# Patient Record
Sex: MALE | Race: WHITE | NOT HISPANIC OR LATINO | Employment: FULL TIME | URBAN - METROPOLITAN AREA
[De-identification: names, ages, dates, MRNs, and addresses within clinical notes are randomized per-mention and may not be internally consistent; named-entity substitution may affect disease eponyms.]

---

## 2023-06-13 ENCOUNTER — HOSPITAL ENCOUNTER (OUTPATIENT)
Dept: GENERAL RADIOLOGY | Facility: HOSPITAL | Age: 63
Discharge: HOME | End: 2023-06-13
Admitting: NURSE PRACTITIONER
Payer: COMMERCIAL

## 2023-06-13 ENCOUNTER — OFFICE VISIT (OUTPATIENT)
Dept: FAMILY MEDICINE CLINIC | Age: 63
End: 2023-06-13
Payer: COMMERCIAL

## 2023-06-13 VITALS
OXYGEN SATURATION: 95 % | BODY MASS INDEX: 31.55 KG/M2 | HEART RATE: 77 BPM | SYSTOLIC BLOOD PRESSURE: 127 MMHG | WEIGHT: 213 LBS | DIASTOLIC BLOOD PRESSURE: 79 MMHG | HEIGHT: 69 IN

## 2023-06-13 DIAGNOSIS — K59.00 CONSTIPATION, UNSPECIFIED CONSTIPATION TYPE: ICD-10-CM

## 2023-06-13 DIAGNOSIS — K59.00 CONSTIPATION, UNSPECIFIED CONSTIPATION TYPE: Primary | ICD-10-CM

## 2023-06-13 PROCEDURE — 99202 OFFICE O/P NEW SF 15 MIN: CPT | Performed by: NURSE PRACTITIONER

## 2023-06-13 PROCEDURE — 74018 RADEX ABDOMEN 1 VIEW: CPT

## 2023-06-13 RX ORDER — LISINOPRIL AND HYDROCHLOROTHIAZIDE 12.5; 1 MG/1; MG/1
1 TABLET ORAL DAILY
COMMUNITY
Start: 2023-05-03

## 2023-06-13 RX ORDER — ATORVASTATIN CALCIUM 20 MG/1
20 TABLET, FILM COATED ORAL
COMMUNITY
Start: 2023-05-03

## 2023-06-13 NOTE — ASSESSMENT & PLAN NOTE
We will get abdominal x-ray today.  Recommend that he increase fluids to include mainly water with no more than 1 diet Coke per day.  Decrease sodium intake.  He has currently been taking Dulcolax and Colace with some good results.  Further treatment pending x-ray results.  Patient defers labs today.  States he will call back to schedule a follow-up visit before he needs refills of lisinopril with hydrochlorothiazide and atorvastatin.  Encourage lifestyle measures for constipation and if not improving may need to refer for lower scope to be done.  Follow-up if symptoms or not improving.  Also increase fiber intake.

## 2023-06-13 NOTE — PROGRESS NOTES
"Chief Complaint  Establish Care (Last PCP Rae WERNER - states he had cologaurd 2-3 years ago )    Subjective          Deshawn Benjamin presents to [facility]     Patient is a 62-year-old male who is here today for his first visit.  Here to discuss concern for recent constipation.  Stools not hard but passes small amounts with straining most of the time for the last couple of months.  He denies abdominal pain, nausea, vomiting, blood in the stool or any other associated concerns.  He does consume a high salt diet and drinks mostly diet Pepsi.  He admits his diet is not as good as it should be.  He does work night shift and needs the caffeine at work.  He denies any urinary symptoms.  Family history is negative for colon cancer or polyps.  He had a Cologuard colon screening test done within the last 2 to 3 years that was negative.     Objective   Vital Signs:   Vitals:    06/13/23 0946   BP: 127/79   BP Location: Left arm   Patient Position: Sitting   Cuff Size: Large Adult   Pulse: 77   SpO2: 95%   Weight: 96.6 kg (213 lb)   Height: 175 cm (68.9\")      Body mass index is 31.55 kg/m².  Physical Exam  Vitals reviewed.   Constitutional:       General: He is not in acute distress.     Appearance: Normal appearance. He is well-developed.   Cardiovascular:      Rate and Rhythm: Normal rate and regular rhythm.      Heart sounds: Normal heart sounds.   Pulmonary:      Effort: Pulmonary effort is normal.      Breath sounds: Normal breath sounds.   Abdominal:      General: Abdomen is flat. Bowel sounds are normal. There is no distension.      Palpations: Abdomen is soft. There is no mass.      Tenderness: There is no abdominal tenderness. There is no guarding or rebound.   Musculoskeletal:      Right lower leg: No edema.      Left lower leg: No edema.   Skin:     General: Skin is warm and dry.   Neurological:      General: No focal deficit present.      Mental Status: He is alert. "   Psychiatric:         Attention and Perception: Attention normal.         Mood and Affect: Mood and affect normal.         Behavior: Behavior normal.         Current Outpatient Medications:     atorvastatin (LIPITOR) 20 MG tablet, Take 1 tablet by mouth every night at bedtime., Disp: , Rfl:     lisinopril-hydrochlorothiazide (PRINZIDE,ZESTORETIC) 10-12.5 MG per tablet, Take 1 tablet by mouth Daily., Disp: , Rfl:     NON FORMULARY, Balance of nature OTC, Disp: , Rfl:    Past Medical History:   Diagnosis Date    Cataract 3 years    GERD (gastroesophageal reflux disease) 3 years    Hypertension 4 years    Neuromuscular disorder     Obesity     Osteopenia     Pancreatitis          Result Review :                Assessment and Plan    Diagnoses and all orders for this visit:    1. Constipation, unspecified constipation type (Primary)  Assessment & Plan:  We will get abdominal x-ray today.  Recommend that he increase fluids to include mainly water with no more than 1 diet Coke per day.  Decrease sodium intake.  He has currently been taking Dulcolax and Colace with some good results.  Further treatment pending x-ray results.  Patient defers labs today.  States he will call back to schedule a follow-up visit before he needs refills of lisinopril with hydrochlorothiazide and atorvastatin.  Encourage lifestyle measures for constipation and if not improving may need to refer for lower scope to be done.  Follow-up if symptoms or not improving.  Also increase fiber intake.    Orders:  -     XR Abdomen KUB; Future        Follow Up    Return if symptoms worsen or fail to improve.  Patient was given instructions and counseling regarding his condition or for health maintenance advice. Please see specific information pulled into the AVS if appropriate.

## 2023-06-14 NOTE — PROGRESS NOTES
You are moderately constipated.  Mild arthritis of hips and low back.  Stop dulcolax and replace with miralax-  take daily until having loose stools.  Continue colace.  If not improving with increased fiber and fluids recommend referral for possible lower scope.  You may also start taking over the counter metamucil.

## 2025-01-29 ENCOUNTER — OFFICE VISIT (OUTPATIENT)
Dept: FAMILY MEDICINE CLINIC | Age: 65
End: 2025-01-29
Payer: COMMERCIAL

## 2025-01-29 ENCOUNTER — HOSPITAL ENCOUNTER (OUTPATIENT)
Dept: GENERAL RADIOLOGY | Facility: HOSPITAL | Age: 65
Discharge: HOME | End: 2025-01-29
Admitting: NURSE PRACTITIONER
Payer: COMMERCIAL

## 2025-01-29 VITALS
TEMPERATURE: 98.4 F | DIASTOLIC BLOOD PRESSURE: 85 MMHG | HEIGHT: 69 IN | OXYGEN SATURATION: 98 % | BODY MASS INDEX: 30.21 KG/M2 | SYSTOLIC BLOOD PRESSURE: 139 MMHG | HEART RATE: 96 BPM | WEIGHT: 204 LBS

## 2025-01-29 DIAGNOSIS — M25.561 ACUTE PAIN OF RIGHT KNEE: ICD-10-CM

## 2025-01-29 DIAGNOSIS — M25.561 ACUTE PAIN OF RIGHT KNEE: Primary | ICD-10-CM

## 2025-01-29 PROCEDURE — 73562 X-RAY EXAM OF KNEE 3: CPT

## 2025-01-29 PROCEDURE — 99213 OFFICE O/P EST LOW 20 MIN: CPT | Performed by: NURSE PRACTITIONER

## 2025-01-29 NOTE — PROGRESS NOTES
"Chief Complaint  Knee Pain (Patient c/o right knee pain since 1/19, patient c/o falling and twisting right knee in [facility] slipping on an open gel packet on floor )    Subjective          Deshawn Benjamin presents to [facility]     Patient is 64-year-old male who is here today to evaluate right knee pain.  He is established with orthopedics, Dr. Arana, currently regarding osteoarthritis of both knees.  He is awaiting insurance approval of gel knee injections.  In the meantime he was in [facility] in [place] on January 19 and slipped on an open gel packet on the floor.  He reports constant right knee pain since that time.  He is currently taking ibuprofen every 6 hours with minimal relief.  His work involves driving a forklift and he is having some stiffness of the knee but denies weakness of the knee.  His primary care office is [facility] and Rae hCristianson who last ordered his blood work and manages his chronic hypertension and hyperlipidemia.  Rae ordered x-rays of the left knee last fall.  He is currently elevating the knee and applying ice when he is able.     Objective   Vital Signs:   Vitals:    01/29/25 1444   BP: 139/85   BP Location: Left arm   Patient Position: Sitting   Cuff Size: Adult   Pulse: 96   Temp: 98.4 °F (36.9 °C)   TempSrc: Temporal   SpO2: 98%   Weight: 92.5 kg (204 lb)   Height: 175 cm (68.9\")   PainSc:   8   PainLoc: Knee       Wt Readings from Last 3 Encounters:   01/29/25 92.5 kg (204 lb)   06/13/23 96.6 kg (213 lb)      BP Readings from Last 3 Encounters:   01/29/25 139/85   06/13/23 127/79       Body mass index is 30.21 kg/m².           Physical Exam  Vitals reviewed.   Constitutional:       General: He is not in acute distress.     Appearance: Normal appearance. He is well-developed.   Cardiovascular:      Rate and Rhythm: Normal rate and regular rhythm.      Pulses:           Popliteal pulses are 2+ on the right side.        Posterior tibial pulses are 2+ " on the right side.      Heart sounds: Normal heart sounds.   Pulmonary:      Effort: Pulmonary effort is normal.      Breath sounds: Normal breath sounds.   Musculoskeletal:      Right lower leg: No edema.      Left lower leg: No edema.      Comments: Limited range of motion with right knee flexion and extension.  Mild soft tissue swelling noted on the posterior right knee.  Anterior right knee is negative for swelling or edema.   Skin:     General: Skin is warm and dry.   Neurological:      General: No focal deficit present.      Mental Status: He is alert.   Psychiatric:         Attention and Perception: Attention normal.         Mood and Affect: Mood and affect normal.         Behavior: Behavior normal.           Current Outpatient Medications:     atorvastatin (LIPITOR) 20 MG tablet, Take 1 tablet by mouth every night at bedtime., Disp: , Rfl:     lisinopril-hydrochlorothiazide (PRINZIDE,ZESTORETIC) 10-12.5 MG per tablet, Take 1 tablet by mouth Daily., Disp: , Rfl:     diclofenac (VOLTAREN) 50 MG EC tablet, Take 1 tablet by mouth 2 (Two) Times a Day As Needed (pain). Take with food, Disp: 30 tablet, Rfl: 0   Past Medical History:   Diagnosis Date    Cataract 3 years    GERD (gastroesophageal reflux disease) 3 years    Hypertension 4 years    Neuromuscular disorder     Obesity     Osteopenia     Pancreatitis      No Known Allergies            Result Review :          XR knee visionaire protocol    Result Date: 11/26/2024  Degenerative changes with no acute bony abnormality. LEFT KNEE SERIES HISTORY: Chronic left knee pain. COMPARISON: None. FINDINGS:  A two view exam demonstrates no acute fracture or dislocation. The joint spaces demonstrate severe patellofemoral and medial compartment joint degenerative change. No soft tissue abnormality is seen. IMPRESSION: Degenerative changes with no acute bony abnormality. Images reviewed, interpreted, and dictated by Dr. ROSEMARIE Jade.  Transcribed by Renee Rosen  TEENA.    XR knee visionaire protocol    Result Date: 11/26/2024  Degenerative changes with no acute bony abnormality. LEFT KNEE SERIES HISTORY: Chronic left knee pain. COMPARISON: None. FINDINGS:  A two view exam demonstrates no acute fracture or dislocation. The joint spaces demonstrate severe patellofemoral and medial compartment joint degenerative change. No soft tissue abnormality is seen. IMPRESSION: Degenerative changes with no acute bony abnormality. Images reviewed, interpreted, and dictated by Dr. ROSEMARIE Jade.  Transcribed by Renee Rosen PA-C.             Social History     Tobacco Use   Smoking Status Never   Smokeless Tobacco Never           Assessment and Plan    Diagnoses and all orders for this visit:    1. Acute pain of right knee (Primary)  Assessment & Plan:  Further treatment recommendations pending x-ray results.  If anything found of significance I will refer him back to his orthopedist for further evaluation and treatment.  Continue to apply 10 to 15-minute intervals of code and elevate knee.  Diclofenac will take the place of his current ibuprofen.  Take diclofenac with food.    Orders:  -     XR Knee 3 View Right; Future  -     diclofenac (VOLTAREN) 50 MG EC tablet; Take 1 tablet by mouth 2 (Two) Times a Day As Needed (pain). Take with food  Dispense: 30 tablet; Refill: 0        Follow Up    No follow-ups on file.  Patient was given instructions and counseling regarding his condition or for health maintenance advice. Please see specific information pulled into the AVS if appropriate.

## 2025-01-29 NOTE — ASSESSMENT & PLAN NOTE
Further treatment recommendations pending x-ray results.  If anything found of significance I will refer him back to his orthopedist for further evaluation and treatment.  Continue to apply 10 to 15-minute intervals of code and elevate knee.  Diclofenac will take the place of his current ibuprofen.  Take diclofenac with food.

## 2025-02-02 NOTE — PROGRESS NOTES
Moderate to advanced arthritis with some fluid build up  (effusion).  Follow up with orthopedics for any persisting concerns.